# Patient Record
Sex: MALE | Race: BLACK OR AFRICAN AMERICAN
[De-identification: names, ages, dates, MRNs, and addresses within clinical notes are randomized per-mention and may not be internally consistent; named-entity substitution may affect disease eponyms.]

---

## 2019-03-22 ENCOUNTER — HOSPITAL ENCOUNTER (EMERGENCY)
Dept: HOSPITAL 53 - M ED | Age: 24
Discharge: HOME | End: 2019-03-22
Payer: COMMERCIAL

## 2019-03-22 VITALS — DIASTOLIC BLOOD PRESSURE: 85 MMHG | SYSTOLIC BLOOD PRESSURE: 129 MMHG

## 2019-03-22 VITALS — BODY MASS INDEX: 26.75 KG/M2 | HEIGHT: 74 IN | WEIGHT: 208.45 LBS

## 2019-03-22 DIAGNOSIS — J03.90: Primary | ICD-10-CM

## 2019-03-22 LAB
BASOPHILS # BLD AUTO: 0 10^3/UL (ref 0–0.2)
BASOPHILS NFR BLD AUTO: 0.6 % (ref 0–1)
BUN SERPL-MCNC: 10 MG/DL (ref 7–18)
CALCIUM SERPL-MCNC: 9.5 MG/DL (ref 8.5–10.1)
CHLORIDE SERPL-SCNC: 106 MEQ/L (ref 98–107)
CO2 SERPL-SCNC: 27 MEQ/L (ref 21–32)
CREAT SERPL-MCNC: 0.98 MG/DL (ref 0.7–1.3)
EOSINOPHIL # BLD AUTO: 0.2 10^3/UL (ref 0–0.5)
EOSINOPHIL NFR BLD AUTO: 4.7 % (ref 0–3)
GFR SERPL CREATININE-BSD FRML MDRD: > 60 ML/MIN/{1.73_M2} (ref 60–?)
GLUCOSE SERPL-MCNC: 89 MG/DL (ref 70–100)
HCT VFR BLD AUTO: 44.1 % (ref 42–52)
HETEROPH AB SER QL LA: NEGATIVE
HGB BLD-MCNC: 14.7 G/DL (ref 13.5–17.5)
LYMPHOCYTES # BLD AUTO: 1.4 10^3/UL (ref 1.5–6.5)
LYMPHOCYTES NFR BLD AUTO: 29.1 % (ref 24–44)
MCH RBC QN AUTO: 30.6 PG (ref 27–33)
MCHC RBC AUTO-ENTMCNC: 33.3 G/DL (ref 32–36.5)
MCV RBC AUTO: 91.9 FL (ref 80–96)
MONOCYTES # BLD AUTO: 0.7 10^3/UL (ref 0–0.8)
MONOCYTES NFR BLD AUTO: 14.5 % (ref 0–5)
NEUTROPHILS # BLD AUTO: 2.4 10^3/UL (ref 1.8–7.7)
NEUTROPHILS NFR BLD AUTO: 50.9 % (ref 36–66)
PLATELET # BLD AUTO: 251 10^3/UL (ref 150–450)
POTASSIUM SERPL-SCNC: 4.1 MEQ/L (ref 3.5–5.1)
RBC # BLD AUTO: 4.8 10^6/UL (ref 4.3–6.1)
SODIUM SERPL-SCNC: 140 MEQ/L (ref 136–145)
WBC # BLD AUTO: 4.7 10^3/UL (ref 4–10)

## 2019-03-22 PROCEDURE — 85025 COMPLETE CBC W/AUTO DIFF WBC: CPT

## 2019-03-22 PROCEDURE — 96374 THER/PROPH/DIAG INJ IV PUSH: CPT

## 2019-03-22 PROCEDURE — 86308 HETEROPHILE ANTIBODY SCREEN: CPT

## 2019-03-22 PROCEDURE — 70491 CT SOFT TISSUE NECK W/DYE: CPT

## 2019-03-22 PROCEDURE — 80048 BASIC METABOLIC PNL TOTAL CA: CPT

## 2019-03-22 PROCEDURE — 86663 EPSTEIN-BARR ANTIBODY: CPT

## 2019-03-22 PROCEDURE — 36415 COLL VENOUS BLD VENIPUNCTURE: CPT

## 2019-03-22 PROCEDURE — 86664 EPSTEIN-BARR NUCLEAR ANTIGEN: CPT

## 2019-03-22 PROCEDURE — 87880 STREP A ASSAY W/OPTIC: CPT

## 2019-03-22 PROCEDURE — 99284 EMERGENCY DEPT VISIT MOD MDM: CPT

## 2019-03-22 PROCEDURE — 86665 EPSTEIN-BARR CAPSID VCA: CPT

## 2019-03-22 NOTE — REP
CT SOFT TISSUES NECK WITH IV CONTRAST:

 

CT soft tissues neck performed following the intravenous administration of 100 mL

of Isovue-370. Sagittal and coronal reconstruction images are performed.

 

There is mild enlargement of the right palatine tonsil. No abscess is seen in the

soft tissues of  the neck. No adenopathy is seen. The thyroid, parotid and

submandibular glands appear unremarkable. Airway is patent. There is no evidence

of airway narrowing. Visualized osseous structures are unremarkable. Visualized

paranasal sinuses are clear with no air fluid levels. The mastoid air cells are

well aerated.

 

IMPRESSION:

 

Mild enlargement of the right palatine tonsil with no evidence of abscess.

 

 

Electronically Signed by

Barrie Padgett MD 03/23/2019 06:41 P

## 2019-03-26 LAB — EBV NA IGG SER-ACNC: 485 U/ML (ref 0–17.9)
